# Patient Record
Sex: MALE | Race: WHITE | NOT HISPANIC OR LATINO | Employment: OTHER | URBAN - METROPOLITAN AREA
[De-identification: names, ages, dates, MRNs, and addresses within clinical notes are randomized per-mention and may not be internally consistent; named-entity substitution may affect disease eponyms.]

---

## 2022-04-14 ENCOUNTER — OFFICE VISIT (OUTPATIENT)
Dept: ORTHOPEDIC SURGERY | Facility: CLINIC | Age: 52
End: 2022-04-14

## 2022-04-14 VITALS
DIASTOLIC BLOOD PRESSURE: 80 MMHG | WEIGHT: 274 LBS | BODY MASS INDEX: 35.16 KG/M2 | HEIGHT: 74 IN | SYSTOLIC BLOOD PRESSURE: 134 MMHG

## 2022-04-14 DIAGNOSIS — M25.561 RIGHT KNEE PAIN, UNSPECIFIED CHRONICITY: ICD-10-CM

## 2022-04-14 DIAGNOSIS — M23.91 INTERNAL DERANGEMENT OF RIGHT KNEE: Primary | ICD-10-CM

## 2022-04-14 PROCEDURE — 99204 OFFICE O/P NEW MOD 45 MIN: CPT | Performed by: ORTHOPAEDIC SURGERY

## 2022-04-14 RX ORDER — MELOXICAM 15 MG/1
TABLET ORAL
Qty: 90 TABLET | Refills: 1 | Status: SHIPPED | OUTPATIENT
Start: 2022-04-14 | End: 2022-08-08

## 2022-04-14 RX ORDER — CIPROFLOXACIN 500 MG/1
500 TABLET, FILM COATED ORAL EVERY 12 HOURS
COMMUNITY
Start: 2022-04-06 | End: 2022-05-31

## 2022-04-14 RX ORDER — LISINOPRIL 20 MG/1
20 TABLET ORAL DAILY
COMMUNITY
Start: 2022-03-26

## 2022-04-14 NOTE — PROGRESS NOTES
Orthopaedic Clinic Note: Knee New Patient    Chief Complaint   Patient presents with   • Right Knee - Pain        HPI    Jean Marie Ramirez is a 51 y.o. male who presents with right knee pain for 4 week(s). Onset twisting injury. Pain is localized to the medial joint line, patella and is a 0/10 on the pain scale. Pain is described as dull. Associated symptoms include pain, swelling, popping and giving way/buckling. The pain is worse with any twisting movement ; resting, sitting, ice, lying down and elevating the extremity make it better. Previous treatments have included: bracing and weight loss since symptom onset. Although some transient relief was reported with these interventions, these conservative measures have failed and symptoms have persisted. The patient is limited in daily activities and has had a significant decrease in quality of life as a result. He denies fevers, chills, or constitutional symptoms.    I have reviewed the following portions of the patient's history:History of Present Illness    History reviewed. No pertinent past medical history.   Past Surgical History:   Procedure Laterality Date   • HERNIA REPAIR        Family History   Problem Relation Age of Onset   • Hypertension Father      Social History     Socioeconomic History   • Marital status:    Tobacco Use   • Smoking status: Never Smoker   • Smokeless tobacco: Never Used   Vaping Use   • Vaping Use: Never used   Substance and Sexual Activity   • Alcohol use: Yes   • Drug use: Never   • Sexual activity: Defer      Current Outpatient Medications on File Prior to Visit   Medication Sig Dispense Refill   • ciprofloxacin (CIPRO) 500 MG tablet Take 500 mg by mouth Every 12 (Twelve) Hours.     • lisinopril (PRINIVIL,ZESTRIL) 20 MG tablet Take 20 mg by mouth Daily.     • [DISCONTINUED] methylPREDNISolone (MEDROL, NEELIMA,) 4 MG tablet Take as directed on package instructions. 21 tablet 0     No current facility-administered medications on file  "prior to visit.      No Known Allergies     Review of Systems   Constitutional: Negative.    HENT: Negative.    Eyes: Negative.    Respiratory: Negative.    Cardiovascular: Negative.    Gastrointestinal: Negative.    Endocrine: Negative.    Genitourinary: Negative.    Musculoskeletal: Positive for arthralgias.   Skin: Negative.    Allergic/Immunologic: Negative.    Neurological: Negative.    Hematological: Negative.    Psychiatric/Behavioral: Negative.         The patient's Review of Systems was personally reviewed and confirmed as accurate.    The following portions of the patient's history were reviewed and updated as appropriate: allergies, current medications, past family history, past medical history, past social history, past surgical history and problem list.    Physical Exam  Blood pressure 134/80, height 188 cm (74.02\"), weight 124 kg (274 lb).    Body mass index is 35.16 kg/m².    GENERAL APPEARANCE: awake, alert & oriented x 3, in no acute distress and well developed, well nourished  PSYCH: normal affect  LUNGS:  breathing nonlabored  EYES: sclera anicteric  CARDIOVASCULAR: palpable dorsalis pedis, palpable posterior tibial bilaterally. Capillary refill less than 2 seconds  EXTREMITIES: no clubbing, cyanosis  GAIT:  Normal            Right Lower Extremity Exam:   ----------  Hip Exam  ----------  FLEXION CONTRACTURE: None  FLEXION: 110 degrees  INTERNAL ROTATION: 20 degrees at 90 degrees of flexion   EXTERNAL ROTATION: 40 degrees at 90 degrees of flexion    PAIN WITH HIP MOTION: no  ----------  Knee Exam  ----------  ALIGNMENT: neutral, no varus or valgus deformity     RANGE OF MOTION:  Normal (0-120 degrees) with no extensor lag or flexion contracture  LIGAMENTOUS STABILITY:   stable to varus and valgus stress at terminal extension and 30 degrees without any evidence of laxity     STRENGTH:  5/5 knee flexion, extension. 5/5 ankle dorsiflexion and plantarflexion.     PAIN WITH PALPATION: denies tenderness " to palpation about the knee, denies medial or lateral joint line pain  KNEE EFFUSION: Trace  PAIN WITH KNEE ROM: no  PATELLAR CREPITUS: no  SPECIAL EXAM FINDINGS:  Negative patellar compression, increased laxity with anterior drawer    REFLEXES:  PATELLAR 2+/4  ACHILLES 2+/4    CLONUS: negative  STRAIGHT LEG TEST:   negative    SENSATION TO LIGHT TOUCH:  DEEP PERONEAL/SUPERFICIAL PERONEAL/SURAL/SAPHENOUS/TIBIAL:   intact    EDEMA:  no  ERYTHEMA:  no  WOUNDS/INCISIONS: no overlying skin problems.      Left Lower Extremity Exam:   ----------  Hip Exam  ----------  FLEXION CONTRACTURE: None  FLEXION: 110 degrees  INTERNAL ROTATION: 20 degrees at 90 degrees of flexion   EXTERNAL ROTATION: 40 degrees at 90 degrees of flexion    PAIN WITH HIP MOTION: no  ----------  Knee Exam  ----------  ALIGNMENT: neutral, no varus or valgus deformity     RANGE OF MOTION:  Normal (0-120 degrees) with no extensor lag or flexion contracture  LIGAMENTOUS STABILITY:   stable to varus and valgus stress at terminal extension and 30 degrees without any evidence of laxity     STRENGTH:  5/5 knee flexion, extension. 5/5 ankle dorsiflexion and plantarflexion.     PAIN WITH PALPATION: denies tenderness to palpation about the knee, denies medial or lateral joint line pain  KNEE EFFUSION: no  PAIN WITH KNEE ROM: no  PATELLAR CREPITUS: yes, asymptomatic  SPECIAL EXAM FINDINGS:  Negative patellar compression    REFLEXES:  PATELLAR 2+/4  ACHILLES 2+/4    CLONUS: negative  STRAIGHT LEG TEST:   negative    SENSATION TO LIGHT TOUCH:  DEEP PERONEAL/SUPERFICIAL PERONEAL/SURAL/SAPHENOUS/TIBIAL:   intact    EDEMA:  no  ERYTHEMA:  no  WOUNDS/INCISIONS: no overlying skin problems.      ______________________________________________________________________  ______________________________________________________________________    RADIOGRAPHIC FINDINGS:   Indication: Right knee pain    Comparison: No prior xrays are available for comparison    Right knee(s) 4  views: mild tricompartmental osteoarthritis with no acute bony injury or fracture.  Small para-articular osteophytes visualized primarily along the medial patellofemoral compartments      Assessment/Plan:   Diagnosis Plan   1. Internal derangement of right knee  meloxicam (MOBIC) 15 MG tablet    MRI Knee Right Without Contrast   2. Right knee pain, unspecified chronicity  XR Knee 4+ View Right     Patient has persistent pain despite attempted bracing.  His clinical exam is concerning for ACL tear as he does have increased laxity with anterior drawer test.  I recommend obtaining an MRI to evaluate for ACL injury.  In the interval I will prescribe him meloxicam.  I will see him back after the MRI to discuss results and how to proceed.    Brett Ayala MD  04/14/22  14:30 EDT

## 2022-05-16 ENCOUNTER — TELEPHONE (OUTPATIENT)
Dept: ORTHOPEDIC SURGERY | Facility: CLINIC | Age: 52
End: 2022-05-16

## 2022-05-16 NOTE — TELEPHONE ENCOUNTER
Spoke with patient he understands that he must be evaluated in the office first to consider a left knee MRI, and also understands that we would proceed with left knee xrays first at his follow up visit for his right knee.

## 2022-05-16 NOTE — TELEPHONE ENCOUNTER
Pt called in to see if he could also get an order put in to get a MRI of his left as well...MRI appt is scheduled for 5/17/22 at [facility]

## 2022-05-17 ENCOUNTER — HOSPITAL ENCOUNTER (OUTPATIENT)
Dept: MRI IMAGING | Facility: HOSPITAL | Age: 52
Discharge: HOME | End: 2022-05-17
Admitting: ORTHOPAEDIC SURGERY

## 2022-05-17 ENCOUNTER — TELEPHONE (OUTPATIENT)
Dept: ORTHOPEDIC SURGERY | Facility: CLINIC | Age: 52
End: 2022-05-17

## 2022-05-17 DIAGNOSIS — M23.91 INTERNAL DERANGEMENT OF RIGHT KNEE: ICD-10-CM

## 2022-05-17 PROCEDURE — 73721 MRI JNT OF LWR EXTRE W/O DYE: CPT

## 2022-05-17 NOTE — TELEPHONE ENCOUNTER
I called patient, he had some questions regarding his past medications that he saw on Mychart. I explained what I could to him and he understood. He will call with any further questions or concerns.  Nathalia

## 2022-05-17 NOTE — TELEPHONE ENCOUNTER
Provider: COLT  Caller: AYSE  Phone Number: [phone]  Reason for Call: PT IS ASKING FOR SOME CLARIFICATION ON HIS OFFICE NOTES ON HIS MYCHART.       TOLD TO PUT IN A TE BY NON CLINICAL STAFF THAT ANSWERED CLINICAL LINE

## 2022-05-31 ENCOUNTER — OFFICE VISIT (OUTPATIENT)
Dept: ORTHOPEDIC SURGERY | Facility: CLINIC | Age: 52
End: 2022-05-31

## 2022-05-31 VITALS
DIASTOLIC BLOOD PRESSURE: 82 MMHG | SYSTOLIC BLOOD PRESSURE: 140 MMHG | WEIGHT: 270 LBS | HEIGHT: 74 IN | BODY MASS INDEX: 34.65 KG/M2

## 2022-05-31 DIAGNOSIS — M17.11 PRIMARY OSTEOARTHRITIS OF RIGHT KNEE: Primary | ICD-10-CM

## 2022-05-31 DIAGNOSIS — S83.231A COMPLEX TEAR OF MEDIAL MENISCUS OF RIGHT KNEE AS CURRENT INJURY, INITIAL ENCOUNTER: ICD-10-CM

## 2022-05-31 PROCEDURE — 99214 OFFICE O/P EST MOD 30 MIN: CPT | Performed by: ORTHOPAEDIC SURGERY

## 2022-05-31 PROCEDURE — 20610 DRAIN/INJ JOINT/BURSA W/O US: CPT | Performed by: ORTHOPAEDIC SURGERY

## 2022-05-31 RX ORDER — TRIAMCINOLONE ACETONIDE 40 MG/ML
80 INJECTION, SUSPENSION INTRA-ARTICULAR; INTRAMUSCULAR
Status: COMPLETED | OUTPATIENT
Start: 2022-05-31 | End: 2022-05-31

## 2022-05-31 RX ORDER — LIDOCAINE HYDROCHLORIDE 10 MG/ML
3 INJECTION, SOLUTION EPIDURAL; INFILTRATION; INTRACAUDAL; PERINEURAL
Status: COMPLETED | OUTPATIENT
Start: 2022-05-31 | End: 2022-05-31

## 2022-05-31 RX ADMIN — LIDOCAINE HYDROCHLORIDE 3 ML: 10 INJECTION, SOLUTION EPIDURAL; INFILTRATION; INTRACAUDAL; PERINEURAL at 15:54

## 2022-05-31 RX ADMIN — TRIAMCINOLONE ACETONIDE 80 MG: 40 INJECTION, SUSPENSION INTRA-ARTICULAR; INTRAMUSCULAR at 15:54

## 2022-05-31 NOTE — PROGRESS NOTES
Procedure   Large Joint Arthrocentesis: R knee  Date/Time: 5/31/2022 3:54 PM  Consent given by: patient  Site marked: site marked  Timeout: Immediately prior to procedure a time out was called to verify the correct patient, procedure, equipment, support staff and site/side marked as required   Supporting Documentation  Indications: pain   Procedure Details  Location: knee - R knee  Preparation: Patient was prepped and draped in the usual sterile fashion  Needle size: 22 G  Approach: anterolateral  Medications administered: 3 mL lidocaine PF 1% 1 %; 80 mg triamcinolone acetonide 40 MG/ML (1cc marcaine .75%, NDC 0228-9969-80, Lot [id number], exp [id number])  Patient tolerance: patient tolerated the procedure well with no immediate complications

## 2022-05-31 NOTE — PROGRESS NOTES
Orthopaedic Clinic Note: Knee Established Patient    Chief Complaint   Patient presents with   • Follow-up     7 week MRI follow up -- MRI done 5/17/22; Internal derangement of right knee         HPI    It has been 7  week(s) since Mr. Ramirez's last visit. He returns to clinic today for follow-up right knee pain.  He underwent MRI on 5/17/2022.  He returns to clinic today for follow-up assessment.  Rates his pain 2/10 on the pain scale.  Localizes it to the medial joint line.  He continues to have episodes of knee buckling and instability.  Denies fevers chills or constitutional symptoms.  Overall he is doing about the same.    Past Medical History:   Diagnosis Date   • Knee sprain 4/6/22    Ligaments apparently torn per MRI results      Past Surgical History:   Procedure Laterality Date   • HERNIA REPAIR        Family History   Problem Relation Age of Onset   • Hypertension Father      Social History     Socioeconomic History   • Marital status:    Tobacco Use   • Smoking status: Never Smoker   • Smokeless tobacco: Never Used   Vaping Use   • Vaping Use: Never used   Substance and Sexual Activity   • Alcohol use: Yes     Alcohol/week: 2.0 standard drinks     Types: 1 Glasses of wine, 1 Cans of beer per week   • Drug use: Never   • Sexual activity: Yes     Partners: Female     Birth control/protection: Post-menopausal      Current Outpatient Medications on File Prior to Visit   Medication Sig Dispense Refill   • lisinopril (PRINIVIL,ZESTRIL) 20 MG tablet Take 20 mg by mouth Daily.     • meloxicam (MOBIC) 15 MG tablet 1 PO Daily with food. 90 tablet 1   • [DISCONTINUED] ciprofloxacin (CIPRO) 500 MG tablet Take 500 mg by mouth Every 12 (Twelve) Hours.       No current facility-administered medications on file prior to visit.      No Known Allergies     Review of Systems   Constitutional: Negative.    HENT: Negative.    Eyes: Negative.    Respiratory: Positive for apnea.    Cardiovascular: Negative.   "  Gastrointestinal: Negative.    Endocrine: Negative.    Genitourinary: Negative.    Musculoskeletal: Positive for arthralgias.   Skin: Negative.    Allergic/Immunologic: Negative.    Neurological: Negative.    Hematological: Negative.    Psychiatric/Behavioral: Negative.         The patient's Review of Systems was personally reviewed and confirmed as accurate.    Physical Exam  Blood pressure 140/82, height 188 cm (74.02\"), weight 122 kg (270 lb).    Body mass index is 34.65 kg/m².    GENERAL APPEARANCE: awake, alert, oriented, in no acute distress and well developed, well nourished  LUNGS:  breathing nonlabored  EXTREMITIES: no clubbing, cyanosis  PERIPHERAL PULSES: palpable dorsalis pedis and posterior tibial pulses bilaterally.    GAIT:  Normal        ----------  Right Knee Exam:  ----------  ALIGNMENT: neutral  ----------  RANGE OF MOTION:  Normal (0-120 degrees) with no extensor lag or flexion contracture  LIGAMENTOUS STABILITY:   stable to varus and valgus stress at terminal extension and 30 degrees without any evidence of laxity  ----------  STRENGTH:  KNEE FLEXION 5/5  KNEE EXTENSION  5/5  ANKLE DORSIFLEXION  5/5  ANKLE PLANTARFLEXION  5/5  ----------  PAIN WITH PALPATION:denies tenderness to palpation about the knee  KNEE EFFUSION: yes, trace effusion  PAIN WITH KNEE ROM: no  PATELLAR CREPITUS:  no  ----------  SENSATION TO LIGHT TOUCH:  DEEP PERONEAL/SUPERFICIAL PERONEAL/SURAL/SAPHENOUS/TIBIAL:    intact  ----------  EDEMA:  no  ERYTHEMA:    no  WOUNDS/INCISIONS:   no  _____________________________________________________________________  _____________________________________________________________________    RADIOGRAPHIC FINDINGS:   MRI from 5/17/2022 of the right knee was personally interpreted.  MRI shows evidence of medial meniscus tear involving the posterior horn and meniscal root with questionable partial radial tear.  There is medial compartment joint space narrowing with extrusion of the medial " meniscus.  Small joint effusion.    Assessment/Plan:   Diagnosis Plan   1. Primary osteoarthritis of right knee     2. Complex tear of medial meniscus of right knee as current injury, initial encounter       I discussed the MRI results with the patient.  I discussed that this is a combination of arthritis and meniscus pathology that are resulting in the MRI findings.  I discussed treatment options with him regarding proceeding to knee arthroscopy versus intra-articular injection.  He is agreeable to the intra-articular injection.  If his symptoms fail to improve with the injection, knee arthroscopy for partial medial meniscectomy will be warranted.  He is agreeable to this plan.  Follow-up as needed.    Procedure Note:  I discussed with the patient the potential benefits of performing a therapeutic injection of the right knee as well as potential risks including but not limited to infection, swelling, pain, bleeding, bruising, nerve/vessel damage, skin color changes, transient elevation in blood glucose levels, and fat atrophy. After informed consent and verifying correct patient, procedure site, and type of procedure, the area was prepped with alcohol, ethyl chloride was used to numb the skin. Via the superior lateral approach, 3cc of 1% lidocaine, 1 cc of 0.75% Marcaine and 2 cc of 40mg/ml of Kenalog were injected into the right knee. The patient tolerated the procedure well. There were no complications. A sterile dressing was placed over the injection site.        Brett Ayala MD  05/31/22  16:18 EDT

## 2022-07-19 ENCOUNTER — TELEPHONE (OUTPATIENT)
Dept: ORTHOPEDIC SURGERY | Facility: CLINIC | Age: 52
End: 2022-07-19

## 2022-07-19 NOTE — TELEPHONE ENCOUNTER
Called patient. niravm letting him know that it was recommended that he take OTC aleve- 2 pills tewice daily until her returns from trip.     Taisha

## 2022-07-19 NOTE — TELEPHONE ENCOUNTER
Pt called in to state that he is out of town and forgot his medication, wanted to know if a 7 day supply of his anti-inflammatory couuld be called in...    [id number] [place], [place]            [phone]

## 2022-08-07 DIAGNOSIS — M23.91 INTERNAL DERANGEMENT OF RIGHT KNEE: ICD-10-CM

## 2022-08-08 RX ORDER — MELOXICAM 15 MG/1
TABLET ORAL
Qty: 60 TABLET | Refills: 0 | Status: SHIPPED | OUTPATIENT
Start: 2022-08-08

## 2022-08-09 NOTE — TELEPHONE ENCOUNTER
Refill was provided today; however, if patient to remain on this medication long-term/chronically then further refills will have to be prescribed by PCP so that appropriate kidney/GI monitoring can be completed.

## 2022-09-29 ENCOUNTER — OFFICE VISIT (OUTPATIENT)
Dept: ORTHOPEDIC SURGERY | Facility: CLINIC | Age: 52
End: 2022-09-29

## 2022-09-29 VITALS
SYSTOLIC BLOOD PRESSURE: 150 MMHG | DIASTOLIC BLOOD PRESSURE: 84 MMHG | WEIGHT: 278.4 LBS | BODY MASS INDEX: 35.73 KG/M2 | HEIGHT: 74 IN

## 2022-09-29 DIAGNOSIS — M75.42 SUBACROMIAL IMPINGEMENT OF LEFT SHOULDER: Primary | ICD-10-CM

## 2022-09-29 DIAGNOSIS — M25.512 LEFT SHOULDER PAIN, UNSPECIFIED CHRONICITY: ICD-10-CM

## 2022-09-29 PROCEDURE — 99214 OFFICE O/P EST MOD 30 MIN: CPT | Performed by: ORTHOPAEDIC SURGERY

## 2022-09-29 PROCEDURE — 20610 DRAIN/INJ JOINT/BURSA W/O US: CPT | Performed by: ORTHOPAEDIC SURGERY

## 2022-09-29 RX ORDER — BUPIVACAINE HYDROCHLORIDE 2.5 MG/ML
3 INJECTION, SOLUTION EPIDURAL; INFILTRATION; INTRACAUDAL
Status: COMPLETED | OUTPATIENT
Start: 2022-09-29 | End: 2022-09-29

## 2022-09-29 RX ORDER — TRIAMCINOLONE ACETONIDE 40 MG/ML
80 INJECTION, SUSPENSION INTRA-ARTICULAR; INTRAMUSCULAR
Status: COMPLETED | OUTPATIENT
Start: 2022-09-29 | End: 2022-09-29

## 2022-09-29 RX ORDER — LIDOCAINE HYDROCHLORIDE 10 MG/ML
3 INJECTION, SOLUTION EPIDURAL; INFILTRATION; INTRACAUDAL; PERINEURAL
Status: COMPLETED | OUTPATIENT
Start: 2022-09-29 | End: 2022-09-29

## 2022-09-29 RX ADMIN — LIDOCAINE HYDROCHLORIDE 3 ML: 10 INJECTION, SOLUTION EPIDURAL; INFILTRATION; INTRACAUDAL; PERINEURAL at 08:06

## 2022-09-29 RX ADMIN — BUPIVACAINE HYDROCHLORIDE 3 ML: 2.5 INJECTION, SOLUTION EPIDURAL; INFILTRATION; INTRACAUDAL at 08:06

## 2022-09-29 RX ADMIN — TRIAMCINOLONE ACETONIDE 80 MG: 40 INJECTION, SUSPENSION INTRA-ARTICULAR; INTRAMUSCULAR at 08:06

## 2022-09-29 NOTE — PROGRESS NOTES
Orthopaedic Clinic Note: Established Patient    Chief Complaint   Patient presents with   • Left Shoulder - Pain        HPI    It has been 4  month(s) since Mr. Ramirez's last visit. He returns to clinic today for new complaint of left shoulder pain.  I previously seen him for knee pain in the past.  He comes to clinic today complaining of pain in the left shoulder that has been ongoing for the past couple of months.  He localizes the pain to the deltoid and subacromial region with overhead activity.  Rates it a 6/10 on the pain scale on average.  He states it is limiting daily activities.    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  Schedule scope?      Past Surgical History:   Procedure Laterality Date   • HERNIA REPAIR        Family History   Problem Relation Age of Onset   • Hypertension Father      Social History     Socioeconomic History   • Marital status:    Tobacco Use   • Smoking status: Never Smoker   • Smokeless tobacco: Never Used   Vaping Use   • Vaping Use: Never used   Substance and Sexual Activity   • Alcohol use: Yes     Alcohol/week: 2.0 standard drinks     Types: 1 Glasses of wine, 1 Cans of beer per week   • Drug use: Never   • Sexual activity: Yes     Partners: Female     Birth control/protection: Post-menopausal      Current Outpatient Medications on File Prior to Visit   Medication Sig Dispense Refill   • lisinopril (PRINIVIL,ZESTRIL) 20 MG tablet Take 20 mg by mouth Daily.     • meloxicam (MOBIC) 15 MG tablet TAKE 1 TABLET BY MOUTH DAILY WITH FOOD 60 tablet 0     No current facility-administered medications on file prior to visit.      No Known Allergies     Review of Systems   Constitutional: Negative.    HENT: Negative.    Eyes: Negative.    Respiratory: Negative.    Cardiovascular:  "Negative.    Gastrointestinal: Negative.    Endocrine: Negative.    Genitourinary: Negative.    Musculoskeletal: Positive for arthralgias.   Skin: Negative.    Allergic/Immunologic: Negative.    Neurological: Negative.    Hematological: Negative.    Psychiatric/Behavioral: Negative.    Answers for HPI/ROS submitted by the patient on 9/22/2022  What is the primary reason for your visit?: Other  Please describe your symptoms.: Left shoulder pain (muscular or tendon related?)  Have you had these symptoms before?: No  How long have you been having these symptoms?: Greater than 2 weeks  Please list any medications you are currently taking for this condition.: Aspercreme and Ibuprofen  Please describe any probable cause for these symptoms. 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  Follow-up vitals       The patient's Review of Systems was personally reviewed and confirmed as accurate.    Physical Exam  Blood pressure 150/84, height 188 cm (74.02\"), weight 126 kg (278 lb 6.4 oz).    Body mass index is 35.73 kg/m².    GENERAL APPEARANCE: awake, alert, oriented, in no acute distress and well developed, well nourished  LUNGS:  breathing nonlabored  EXTREMITIES: no clubbing, cyanosis        Left Upper Extremity Exam:   LEFT SHOULDER EXAM:    RANGE OF MOTION:  ---------------  Forward Flexion: 0 - 180 degrees   Abduction: 0 - 180 degrees  Internal Rotation: T10 degrees  External Rotation: 50 degrees  ---------------  STRENGTH:  ---------------  Supraspinatus: 5/5  Infraspinatus: 5/5  Teres Minor: 5/5  Subscapularis: 5/5  ---------------  PAIN WITH PALPATION: Tender to palpation about subdeltoid bursa  ---------------  PROVOCATIVE MANEUVERS:  ---------------  Yergason's Test: negative  Speeds Test:  negative  Rivera Test: negative  Neer Test: positive  Cross Body Adduction Test: negative  Apprehension Test: negative  ---------------  SENSATION TO LIGHT TOUCH:  AXILLARY/MUSCULOCUTANEOUS/MEDIAN/RADIAL/ULNAR:   " intact    Palpable radial pulse    EDEMA:  no  ERYTHEMA:  no  WOUNDS/INCISIONS:  no    _______________________________________________________________  _______________________________________________________________    RADIOGRAPHIC FINDINGS:   Indication: Left shoulder pain    Comparison: No prior xrays are available for comparison    Left shoulder 3 views: Radiographs demonstrate moderate AC joint arthritis with mild glenohumeral joint arthritis.  No acute bony injury or fracture.      Assessment/Plan:   Diagnosis Plan   1. Subacromial impingement of left shoulder     2. Left shoulder pain, unspecified chronicity  XR Shoulder 2+ View Left     Patient suffering from subacromial impingement of the left shoulder.  I discussed treatment options.  He is agreeable to left shoulder subacromial bursa steroid injection today.  He will follow-up as needed.    Procedure Note:  I discussed with the patient the potential benefits of performing a therapeutic injection of the left shoulder subacromial space as well as potential risks including but not limited to infection, swelling, pain, bleeding, bruising, nerve/vessel damage, skin color changes, transient elevation in blood glucose levels, and fat atrophy. After informed consent and verifying correct patient, procedure site, and type of procedure, the area was prepped with alcohol, ethyl chloride was used to numb the skin. Via the superolateral approach, 3cc of 1% lidocaine, 3 cc of 0.25% Marcaine and 2 cc of 40mg/ml of Kenalog were injected into the left shoulder subacromial space. The patient tolerated the procedure well. There were no complications. A sterile dressing was placed over the injection site.      Brett Ayala MD  09/29/22  08:14 EDT

## 2022-09-29 NOTE — PROGRESS NOTES
Procedure   Large Joint Arthrocentesis: L subacromial bursa  Date/Time: 9/29/2022 8:06 AM  Consent given by: patient  Site marked: site marked  Timeout: Immediately prior to procedure a time out was called to verify the correct patient, procedure, equipment, support staff and site/side marked as required   Supporting Documentation  Indications: pain   Procedure Details  Location: shoulder - L subacromial bursa  Preparation: Patient was prepped and draped in the usual sterile fashion  Needle size: 22 G  Approach: posterior  Medications administered: 80 mg triamcinolone acetonide 40 MG/ML; 3 mL lidocaine PF 1% 1 %; 3 mL bupivacaine (PF) 0.25 %  Patient tolerance: patient tolerated the procedure well with no immediate complications

## 2023-04-14 ENCOUNTER — TELEPHONE (OUTPATIENT)
Dept: ORTHOPEDIC SURGERY | Facility: CLINIC | Age: 53
End: 2023-04-14
Payer: COMMERCIAL

## 2023-04-14 NOTE — TELEPHONE ENCOUNTER
PATIENT CALLED AND IS REQUESTING A PRESCRIPTION FOR A NEW KNEE BRACE.  PLEASE ADVISE.  HE MAY BE REACHED [phone]

## 2023-04-14 NOTE — TELEPHONE ENCOUNTER
Discussed with Cary in office who signed a script for an OA Reaction Web knee brace.     Called pt to find out how he would like to receive script and he may come by to  the script or I told him I may be able to discuss with Kobe to see if we can provide him with the brace despite having a recent office visit; If we did stop by, it would be Monday or Tuesday. Script placed in file folder at .    Moi TURNER CMA ([facility]), ROT

## 2023-04-14 NOTE — TELEPHONE ENCOUNTER
Spoke to Dr. Ayala in office regarding pt message and he is recommending pt come in for a repeat exam before writing script for a new knee brace.    Moi TURNER CMA ([facility]), ROT

## 2023-04-14 NOTE — TELEPHONE ENCOUNTER
Called pt to let him know Dr. Ayala's message; He explains that his knee is actually doing okay with the use of the current brace he is wearing and doesn't feel like he needs to come back in; the velcro on the brace has just become worn which is why he is requesting a script for a new brace.    He also explains that at his first visit with Dr. Ayala for the knee, he wore his current brace (that he had purchased offline) and Dr. Ayala mentioned that the type of brace was appropriate and was actually the same type we would have provided him. So I told him I would check with Dr. Ayala or his PA to see if they would be agreeable to providing a script for a new [place] OA reaction web knee brace. He understood.    Mio TURNER CMA ([facility]), ROT

## 2024-04-01 ENCOUNTER — OFFICE VISIT (OUTPATIENT)
Dept: ORTHOPEDIC SURGERY | Facility: CLINIC | Age: 54
End: 2024-04-01
Payer: COMMERCIAL

## 2024-04-01 VITALS
SYSTOLIC BLOOD PRESSURE: 128 MMHG | DIASTOLIC BLOOD PRESSURE: 78 MMHG | BODY MASS INDEX: 36.5 KG/M2 | WEIGHT: 284.4 LBS | HEIGHT: 74 IN

## 2024-04-01 DIAGNOSIS — S63.639A SPRAIN OF PROXIMAL INTERPHALANGEAL (PIP) JOINT OF FINGER: Primary | ICD-10-CM

## 2024-04-01 DIAGNOSIS — M79.642 LEFT HAND PAIN: ICD-10-CM

## 2024-04-01 PROCEDURE — 99213 OFFICE O/P EST LOW 20 MIN: CPT | Performed by: STUDENT IN AN ORGANIZED HEALTH CARE EDUCATION/TRAINING PROGRAM

## 2024-04-01 RX ORDER — ATORVASTATIN CALCIUM 10 MG/1
10 TABLET, FILM COATED ORAL DAILY
COMMUNITY
Start: 2023-09-29 | End: 2024-09-28

## 2024-04-01 RX ORDER — LISINOPRIL AND HYDROCHLOROTHIAZIDE 12.5; 1 MG/1; MG/1
1 TABLET ORAL DAILY
COMMUNITY
Start: 2024-03-01

## 2024-04-01 RX ORDER — METHYLPREDNISOLONE 4 MG/1
1 TABLET ORAL DAILY
Qty: 21 TABLET | Refills: 0 | Status: SHIPPED | OUTPATIENT
Start: 2024-04-01

## 2024-04-01 RX ORDER — APIXABAN 5 MG/1
5 TABLET, FILM COATED ORAL
COMMUNITY
Start: 2024-03-15

## 2024-04-01 RX ORDER — LISINOPRIL 20 MG/1
1 TABLET ORAL DAILY
COMMUNITY
Start: 2024-01-17

## 2024-04-01 NOTE — PROGRESS NOTES
"                                                                 [facility] Orthopedic     Office Visit       Date: 04/01/2024   Patient Name: Jean Marie Ramirez  MRN: [id number]  YOB: 1970    Referring Physician: No ref. provider found     Chief Complaint:   Chief Complaint   Patient presents with    Left Hand - Pain     Long finger pain       History of Present Illness:   Jean Marie Ramirez is a 53 y.o. male right-hand-dominant presented clinic with complaints of left long finger pain.  Patient reports that 3 years ago he sustained an injury to his left long and ring fingers when his dog leash was wrapped around the digits.  This required splinting for wound healing.  Since that time he has had intermittent pain swelling and stiffness mostly to the long finger PIP joint.  He reports this worsened several days ago with associated swelling and increased stiffness.  This has improved.  He has been using a splint at home as needed.  He is also been taking ibuprofen.  No recent reinjury.  No other complaints or concerns.    Subjective   Review of Systems:   Review of Systems   Pertinent review of systems per HPI    I reviewed the patient's chief complaint, history of present illness, review of systems, past medical history, surgical history, family history, social history, medications and allergy list in the EMR on 04/01/2024 and agree with the findings above.    Objective    Quality Measures:   ACP:   ACP discussion was declined by the patient.      Tobacco:   Jean Marie Ramirez  reports that he has never smoked. He has never used smokeless tobacco.     Vital Signs:   Vitals:    04/01/24 1305   BP: 128/78   Weight: 129 kg (284 lb 6.4 oz)   Height: 188 cm (74\")     BMI:      General: No acute distress. Alert and oriented.     Ortho Exam:  Examination of left upper extremity demonstrates no deformity.  There are no skin lesions or abrasions.  Mild swelling noted throughout the long finger worse at the PIP joint as " compared to the contralateral side.  He is diffusely tender at the long finger PIP joint along the radial and ulnar borders as well as the volar plate.  There is no PIP joint instability noted at full extension or 30 degrees of flexion.  He is able to make a composite fist.  Sensations intact to light touch throughout the hand.  Warm and well-perfused distally.    Imaging / Studies:    Imaging Results (Last 24 Hours)       Procedure Component Value Units Date/Time    XR Hand 3+ View Left [[id number]] Resulted: 04/01/24 1332     Updated: 04/01/24 1334    Narrative:      Left Hand X-Ray    Indication: Pain    Views:  PA, Lateral, and Oblique     Comparison:  None    Findings:  No fractures or dislocation. No bony lesions. Normal soft tissues.   Maintained joint spaces without osteophyte formation noted at the MCP   joints.  There is mild joint space narrowing and osteophyte formation   noted at the thumb CMC joint.    Impression:   Mild thumb CMC joint degenerative changes.               Assessment / Plan    Assessment/Plan:   Jean Marie Ramirez is a 53 y.o. male with a left long finger PIP joint sprain.    I discussed with the patient their clinical and radiographic findings demonstrate a PIP joint sprain.  We had a lengthy discussion regarding the pathophysiology of their diagnosis.  The patient had a previous injury several years ago with intermittent symptoms of swelling and stiffness to the PIP joint.  There are no acute fractures or significant underlying arthritic changes noted on x-ray.  His clinical exam demonstrates mild tenderness diffusely about the PIP joint without any significant abnormalities noted.  I will prescribe him a Medrol Dosepak for pain.  I will also refer to be evaluated by our hand therapist for a home exercise program.  I will see him back in 2 months for reevaluation of his symptoms.  He may cancel if he is doing well. They were agreeable with the plan.  All questions and concerns were  addressed.        ICD-10-CM ICD-9-CM   1. Sprain of proximal interphalangeal (PIP) joint of finger  S63.639A 842.13   2. Left hand pain  M79.642 729.5     Follow Up:   Return in about 2 months (around 6/1/2024) for Follow Up.      Aliza Mccormick MD  [facility] [place] Orthopedic & Hand Surgeon

## 2024-04-15 ENCOUNTER — TREATMENT (OUTPATIENT)
Dept: PHYSICAL THERAPY | Facility: CLINIC | Age: 54
End: 2024-04-15
Payer: COMMERCIAL

## 2024-04-15 DIAGNOSIS — S63.639A SPRAIN OF PROXIMAL INTERPHALANGEAL (PIP) JOINT OF FINGER: ICD-10-CM

## 2024-04-15 DIAGNOSIS — M24.9 DERANGEMENT OF HAND JOINT: Primary | ICD-10-CM

## 2024-04-15 PROCEDURE — 97530 THERAPEUTIC ACTIVITIES: CPT | Performed by: PHYSICAL THERAPIST

## 2024-04-15 PROCEDURE — 97161 PT EVAL LOW COMPLEX 20 MIN: CPT | Performed by: PHYSICAL THERAPIST

## 2024-04-15 NOTE — PROGRESS NOTES
Physical Therapy Initial Evaluation and Plan of Care  [facility]ICAL THERAPY [place]  [address] [facility], [address] [address]        Patient: Jean Marie Ramirez   : 1970  Diagnosis/ICD-10 Code:  Derangement of hand joint [M24.9]  Referring practitioner: Aliza Mccormick MD  Date of Initial Visit: 4/15/2024  Today's Date: 4/15/2024  Patient seen for 1 sessions         Visit Diagnoses:    ICD-10-CM ICD-9-CM   1. Derangement of hand joint  M24.9 718.94   2. Sprain of proximal interphalangeal (PIP) joint of finger  S63.639A 842.13         Subjective Questionnaire: QuickDASH: 20.45%    Subjective Evaluation    History of Present Illness  Mechanism of injury:  53 y.o. male right-hand-dominant presented clinic with complaints of left long finger pain.  Patient reports that 3 years ago he sustained an injury to his left long and ring fingers when his dog leash was wrapped around the digits.  Creating wounds took a while to heal.  This required splinting for wound healing.  Since that time he has had intermittent pain swelling and stiffness mostly to the long finger PIP joint.  He reports this worsened several days ago with associated swelling and increased stiffness.  This has improved.  He has been using a splint at home as needed.  He is also been taking ibuprofen.    CURRENT COMPLAINT  Feels like EDC tendon is cramping, very sharp pain at dorsal PIP joint    Pain  Current pain ratin  At worst pain ratin    Hand dominance: right           Objective          Observations     Left Wrist/Hand   Positive for edema (Mild plus PIP joint).       Tenderness     Additional Tenderness Details  Left Long Finger Tender radial side/dorsal PIP joint, on the extensor ortega.      Neurological Testing     Sensation     Wrist/Hand   Left   Intact: light touch and pin prick    Active Range of Motion     Left Wrist   Normal active range of motion    Left Digits    Flexion   Middle     MCP: 95 degrees    PIP: 98  degrees    DIP: 65 degrees  Extension   Middle     MCP: 0 degrees    PIP: 0 degrees    DIP: 0 degrees    Strength/Myotome Testing     Left Wrist/Hand   Wrist extension: 5  Wrist flexion: 5     (2nd hand position)   Left  strength (2nd hand position) 87 lbs    Thumb Strength  Palmar/Three-Point Pinch     Trial 1: 21 lbs    Right Wrist/Hand      (2nd hand position)   Right  strength (2nd hand position) 98 lbs    Thumb Strength   Palmar/Three-Point Pinch     Trial 1: 19 lbs    Tests     Left Wrist/Hand   Positive RCL varus stress (No laxity but painful dorsally) and resisted middle finger (Painful at dorsal long finger PIP joint).           Assessment & Plan       Assessment  Impairments: abnormal or restricted ROM, activity intolerance, impaired physical strength and pain with function   Functional limitations: carrying objects, lifting, pulling, uncomfortable because of pain and unable to perform repetitive tasks   Assessment details: Pt. is a 53 year old male who has experienced an injury at home to the left long finger about 3 years ago, from a dog leash injury.  This has resulted in an injury to the PIP joint, resulting in a derangement of the finger.  Suspect a radial side dorsal ortega ada with possible scarring issue. The normal neurological exam is normal.  Problems:  discomfort, decreased strength, decreased ROM of finger, and decrease functional activities.    Prognosis: good  Prognosis details:       Goals  Plan Goals:   STG to be met in 4 weeks  1. Increase PIP flexion to 100 degrees of the left long finger.  2. Increase PIP extension to 0 degrees of the Left long finger  3. Pt. has at least 80 lbs. of  on the left.  4. Pt. reports decrease in finger pain by 50%.  LTG to be met in 12 weeks  1. Pt. Independent with HEP.  2. Pt. returns to normal activities and work without complications.  3. Improved hand function so that Quick Dash score improves to 10%  4. Pt. has functional ROM of the  left long finger.    Plan  Therapy options: will be seen for skilled therapy services  Planned modality interventions: cryotherapy, fluidotherapy, high voltage pulsed current (pain management), iontophoresis and ultrasound  Planned therapy interventions: compression, fine motor coordination training, functional ROM exercises, home exercise program, joint mobilization, manual therapy, neuromuscular re-education, orthotic fitting/training, soft tissue mobilization, strengthening, stretching and therapeutic activities  Frequency: 2x month  Duration in weeks: 12  Treatment plan discussed with: patient        Timed:         Manual Therapy:         mins  71533;     Therapeutic Exercise:         mins  76466;     Neuromuscular Erika:        mins  86416;    Therapeutic Activity:     16     mins  62302;     Gait Training:           mins  08101;     Ultrasound:          mins  05729;    Ionto                                   mins   86986  Self Care                            mins   14125  Canalith Repos         mins 84596      Un-Timed:  Electrical Stimulation:         mins  36741 ( );  Dry Needling          mins self-pay  Traction     20     mins 59841  Low Eval          Mins  75748  Mod Eval          Mins  13151  High Eval                            Mins  98309        Timed Treatment:   16   mins   Total Treatment:     36   mins          PT: Hammad Bolden PT, [facility]     License Number: [id number]  Electronically signed by Hammad Bolden PT, 04/15/24, 11:06 AM EDT    Initial Certification  Certification Period: 7/14/2024  I certify that the therapy services are furnished while this patient is under my care.  The services outlined above are required by this patient, and will be reviewed every 90 days.     PHYSICIAN: ________________________________________________________  Aliza Mccormick MD        DATE: ____________________________________________________________    Please sign and return via fax to [phone]..  Thank you, [facility].

## 2024-05-30 ENCOUNTER — OFFICE VISIT (OUTPATIENT)
Dept: ORTHOPEDIC SURGERY | Facility: CLINIC | Age: 54
End: 2024-05-30
Payer: COMMERCIAL

## 2024-05-30 ENCOUNTER — TELEPHONE (OUTPATIENT)
Dept: ORTHOPEDIC SURGERY | Facility: CLINIC | Age: 54
End: 2024-05-30

## 2024-05-30 VITALS
HEIGHT: 74 IN | DIASTOLIC BLOOD PRESSURE: 84 MMHG | SYSTOLIC BLOOD PRESSURE: 130 MMHG | WEIGHT: 289.4 LBS | BODY MASS INDEX: 37.14 KG/M2

## 2024-05-30 DIAGNOSIS — M75.42 IMPINGEMENT SYNDROME OF LEFT SHOULDER: Primary | ICD-10-CM

## 2024-05-30 DIAGNOSIS — M25.512 LEFT SHOULDER PAIN, UNSPECIFIED CHRONICITY: ICD-10-CM

## 2024-05-30 DIAGNOSIS — M75.82 ROTATOR CUFF TENDINITIS, LEFT: ICD-10-CM

## 2024-05-30 RX ORDER — LIDOCAINE HYDROCHLORIDE 10 MG/ML
5 INJECTION, SOLUTION EPIDURAL; INFILTRATION; INTRACAUDAL; PERINEURAL
Status: COMPLETED | OUTPATIENT
Start: 2024-05-30 | End: 2024-05-30

## 2024-05-30 RX ORDER — TRIAMCINOLONE ACETONIDE 40 MG/ML
40 INJECTION, SUSPENSION INTRA-ARTICULAR; INTRAMUSCULAR
Status: COMPLETED | OUTPATIENT
Start: 2024-05-30 | End: 2024-05-30

## 2024-05-30 RX ADMIN — TRIAMCINOLONE ACETONIDE 40 MG: 40 INJECTION, SUSPENSION INTRA-ARTICULAR; INTRAMUSCULAR at 12:03

## 2024-05-30 RX ADMIN — LIDOCAINE HYDROCHLORIDE 5 ML: 10 INJECTION, SOLUTION EPIDURAL; INFILTRATION; INTRACAUDAL; PERINEURAL at 12:03

## 2024-05-30 NOTE — PROGRESS NOTES
[facility] Office Follow Up       Office Follow Up Visit       Patient Name: Jean Marie Ramirez    Chief Complaint:   Chief Complaint   Patient presents with    Follow-up     2 year f/u - Subacromial impingement of left shoulder. Last cortisone injection 09/29/2022       Referring Physician: No ref. provider found    History of Present Illness:   Jean Marie Ramirez returns to clinic today for follow-up of left shoulder pain.  Ongoing since 2022.  He recalls an injury at that time in which he was lifting a heavy tent over his left shoulder.  He saw Dr. Ayala and received a left shoulder subacromial injection.  Responded well.  He says he has had a return of pain over the last several months.  No recent trauma or injury.  He has had difficulty reaching above his head.  Pain relieved with rest and anti-inflammatories.    Upcoming camping trip with his family to [place].      Subjective     Review of Systems   Constitutional: Negative.  Negative for chills, fatigue and fever.   HENT: Negative.  Negative for congestion and dental problem.    Eyes: Negative.  Negative for blurred vision.   Respiratory: Negative.  Negative for shortness of breath.    Cardiovascular: Negative.  Negative for leg swelling.   Gastrointestinal: Negative.  Negative for abdominal pain.   Endocrine: Negative.  Negative for polyuria.   Genitourinary: Negative.  Negative for difficulty urinating.   Musculoskeletal:  Positive for arthralgias.   Skin: Negative.    Allergic/Immunologic: Negative.    Neurological: Negative.    Hematological: Negative.  Negative for adenopathy.   Psychiatric/Behavioral: Negative.  Negative for behavioral problems.         I have reviewed and updated the following portions of the patient's history and review of systems: allergies, current medications, past family history, past medical history, past social history, past surgical history and problem  "list.    Medications:   Current Outpatient Medications:     atorvastatin (LIPITOR) 10 MG tablet, Take 1 tablet by mouth Daily., Disp: , Rfl:     Eliquis 5 MG tablet tablet, Take 1 tablet by mouth., Disp: , Rfl:     lisinopril (PRINIVIL,ZESTRIL) 20 MG tablet, Take 1 tablet by mouth Daily., Disp: , Rfl:     lisinopril-hydrochlorothiazide (PRINZIDE,ZESTORETIC) 10-12.5 MG per tablet, Take 1 tablet by mouth Daily., Disp: , Rfl:     methylPREDNISolone (MEDROL) 4 MG dose pack, Take 1 tablet by mouth Daily. Use as directed by package instructions, Disp: 21 tablet, Rfl: 0    Allergies: No Known Allergies      Objective      Vital Signs:   Vitals:    05/30/24 1131   BP: 130/84   Weight: 131 kg (289 lb 6.4 oz)   Height: 188 cm (74.02\")       Ortho Exam:  General: no acute distress, comfortable  Vitals reviewed in chart    Musculoskeletal Exam    SIDE: Left shoulder  Shoulder Exam:    Tenderness: rotator cuff    Range of motion measurements (degrees)  Forward flexion/Abduction/External rotation at side/ER at 90/IR at 90/IR position  Active: 160/150/60/60  Passive: same    Painful arc of motion: yes  No evidence of septic joint  Pain with forward flexion and abduction greater: yes  Impingement test: painful    Rotator Cuff Testing:  Tenderness to palpation at rotator cuff - yes  Rotator cuff testing Ariel's test - painful  Rotator cuff testing External rotation - strong  Rotator cuff testing Lag signs - negative  Pain with abduction great than 90 degrees - yes    Long head of the biceps testing:  Szymanski's test for biceps - negative  Bicipital groove tenderness to palpation -no  Speed's test  - negative    AC Joint:  AC joint tenderness to palpation - no  AC joint Prominence - no      Results Review:  XR Shoulder 2+ View Left  Imaging: shoulder x-rays 3 views - AP, axillary, and scapular-Y x-ray   views    Side: LEFT SHOULDER    Indication for shoulder x-ray 3 views: shoulder pain    Comparison: 9/29/2022 comparison views " available    Findings: No acute bony pathology. No superior humeral head migration.    The humeral head remains centered in the glenohumeral joint.     Baseline degenerative AC joint changes.  Small inferior glenoid osteophyte   stable compared to prior.  Small osteophyte noted off of the greater   tuberosity but no obvious evidence of acute bony change-well-corticated.    I personally reviewed the above x-rays.      Assessment / Plan      Assessment:   Diagnoses and all orders for this visit:    1. Impingement syndrome of left shoulder (Primary)  -     - Large Joint Arthrocentesis: L subacromial bursa    2. Rotator cuff tendinitis, left    3. Left shoulder pain, unspecified chronicity  -     XR Shoulder 2+ View Left        Quality Metrics:   BMI:   Class 2 Severe Obesity (BMI >=35 and <=39.9). Obesity-related health conditions include the following: none.     Tobacco:   Jean Marie Ramirez  reports that he has never smoked. He has never used smokeless tobacco.           Plan:  X-ray images left shoulder personally reviewed and interpreted today.  No evidence of acute bony findings.  Mild degenerative changes similar compared to prior.  Recommend corticosteroid injection into left subacromial space today for impingement symptoms.  Recommend use of voltaren gel on shoulder as needed.   We will reassess in a couple months if needed.  May consider MRI if symptoms persist.    SHOULDER SUBACROMIAL SPACE INJECTION: Risks and benefits of a shoulder subacromial space injection were discussed and the patient desired to proceed. Verbal consent was obtained. The patient understood the risk of infection, potential skin changes, bump in blood glucose especially with diabetes, nerve injury, possibility of increased pain in the short term, and possible incomplete pain relief.  Using sterile technique, the shoulder subacromial space was injected from a posterior approach with 1mL of 40 mg triamcinolone acetonide 40 MG/ML and 4cc of  lidocaine with aspiration prior to injection. The patient tolerated the procedure without difficulty.  CPT CODE 35650 for major joint aspiration/injection      Follow-up in 6-8 weeks if needed    Daya Michaels PA-C  [facility] Orthopedic Surgery    Dictated using Dragon Speech Recognition.

## 2024-05-30 NOTE — PROGRESS NOTES
Procedure   - Large Joint Arthrocentesis: L subacromial bursa on 5/30/2024 12:03 PM  Indications: pain  Details: 21 G needle, posterior approach  Medications: 5 mL lidocaine PF 1% 1 %; 40 mg triamcinolone acetonide 40 MG/ML  Outcome: tolerated well, no immediate complications  Procedure, treatment alternatives, risks and benefits explained, specific risks discussed. Consent was given by the patient. Immediately prior to procedure a time out was called to verify the correct patient, procedure, equipment, support staff and site/side marked as required. Patient was prepped and draped in the usual sterile fashion.

## 2024-05-30 NOTE — TELEPHONE ENCOUNTER
Called the patient to let him know that Daya does not recommend he take meloxicam , because he is already taking eliquis and both thin the blood . She recommended Voltaren gel on the shoulder instead . He verbalized understanding

## 2024-05-30 NOTE — TELEPHONE ENCOUNTER
Patient received natalia injection today in shoulder and is requesting meloxicam (MOBIC) 15 MG tablet sent to pharmacy.       [id number] - [place], [place] - [address] AT [facility] [address] - [phone] PH

## 2024-07-04 ENCOUNTER — PATIENT MESSAGE (OUTPATIENT)
Dept: ORTHOPEDIC SURGERY | Facility: CLINIC | Age: 54
End: 2024-07-04
Payer: COMMERCIAL

## 2024-07-05 NOTE — TELEPHONE ENCOUNTER
From: Jean Marie Ramirez  To: Daya Michaels  Sent: 7/4/2024 12:04 PM EDT  Subject: Please cancel my follow-up visit for 7/11/2024.     My shoulder is feeling great. No complaints.

## 2025-03-13 ENCOUNTER — OFFICE VISIT (OUTPATIENT)
Dept: ORTHOPEDIC SURGERY | Facility: CLINIC | Age: 55
End: 2025-03-13
Payer: COMMERCIAL

## 2025-03-13 VITALS
DIASTOLIC BLOOD PRESSURE: 80 MMHG | WEIGHT: 294 LBS | BODY MASS INDEX: 37.73 KG/M2 | HEIGHT: 74 IN | SYSTOLIC BLOOD PRESSURE: 160 MMHG

## 2025-03-13 DIAGNOSIS — M25.511 RIGHT SHOULDER PAIN, UNSPECIFIED CHRONICITY: ICD-10-CM

## 2025-03-13 DIAGNOSIS — M75.41 IMPINGEMENT SYNDROME OF RIGHT SHOULDER: Primary | ICD-10-CM

## 2025-03-13 DIAGNOSIS — M25.512 LEFT SHOULDER PAIN, UNSPECIFIED CHRONICITY: ICD-10-CM

## 2025-03-13 RX ORDER — TRIAMCINOLONE ACETONIDE 40 MG/ML
40 INJECTION, SUSPENSION INTRA-ARTICULAR; INTRAMUSCULAR
Status: COMPLETED | OUTPATIENT
Start: 2025-03-13 | End: 2025-03-13

## 2025-03-13 RX ORDER — LIDOCAINE HYDROCHLORIDE 10 MG/ML
5 INJECTION, SOLUTION EPIDURAL; INFILTRATION; INTRACAUDAL; PERINEURAL
Status: COMPLETED | OUTPATIENT
Start: 2025-03-13 | End: 2025-03-13

## 2025-03-13 RX ORDER — ATORVASTATIN CALCIUM 20 MG/1
20 TABLET, FILM COATED ORAL DAILY
COMMUNITY
Start: 2025-02-20

## 2025-03-13 RX ADMIN — LIDOCAINE HYDROCHLORIDE 5 ML: 10 INJECTION, SOLUTION EPIDURAL; INFILTRATION; INTRACAUDAL; PERINEURAL at 10:32

## 2025-03-13 RX ADMIN — TRIAMCINOLONE ACETONIDE 40 MG: 40 INJECTION, SUSPENSION INTRA-ARTICULAR; INTRAMUSCULAR at 10:32

## 2025-03-13 NOTE — PROGRESS NOTES
[facility] Office Visit - Daya Michaels PA-C    Office Visit       Patient Name: Jean Marie Ramirez    Chief Complaint:   Chief Complaint   Patient presents with    Right Shoulder - Pain, Initial Evaluation       Referring Physician: No ref. provider found    History of Present Illness:   Jean Marie Ramirez is a 54 y.o. male who presents with right shoulder pain.  Ongoing for the last month.  Denies injury or trauma.  Gradually worsening.  Has a difficult time reaching behind his back.  He has recently been working on some home projects in which the shoulder pain worsens.     Previously treated for left shoulder pain.  Left shoulder subacromial corticosteroid injection last year provided great relief.      Subjective     Review of Systems   Constitutional: Negative.  Negative for chills, fatigue and fever.   HENT: Negative.  Negative for congestion and dental problem.    Eyes: Negative.  Negative for blurred vision.   Respiratory: Negative.  Negative for shortness of breath.    Cardiovascular: Negative.  Negative for leg swelling.   Gastrointestinal: Negative.  Negative for abdominal pain.   Endocrine: Negative.  Negative for polyuria.   Genitourinary: Negative.  Negative for difficulty urinating.   Musculoskeletal:  Positive for arthralgias.   Skin: Negative.    Allergic/Immunologic: Negative.    Neurological: Negative.    Hematological: Negative.  Negative for adenopathy.   Psychiatric/Behavioral: Negative.  Negative for behavioral problems.         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  Schedule scope?       Past Surgical History:   Past Surgical History:   Procedure Laterality Date    HERNIA REPAIR         Family  "History:   Family History   Problem Relation Age of Onset    Hypertension Father        Social History:   Social History     Socioeconomic History    Marital status:    Tobacco Use    Smoking status: Never    Smokeless tobacco: Never   Vaping Use    Vaping status: Never Used   Substance and Sexual Activity    Alcohol use: Yes     Alcohol/week: 2.0 standard drinks of alcohol     Types: 1 Glasses of wine, 1 Cans of beer per week    Drug use: Never    Sexual activity: Yes     Partners: Female     Birth control/protection: Post-menopausal       Medications:   Current Outpatient Medications:     atorvastatin (LIPITOR) 20 MG tablet, Take 1 tablet by mouth Daily., Disp: , Rfl:     Eliquis 5 MG tablet tablet, Take 1 tablet by mouth., Disp: , Rfl:     lisinopril (PRINIVIL,ZESTRIL) 20 MG tablet, Take 1 tablet by mouth Daily., Disp: , Rfl:     lisinopril-hydrochlorothiazide (PRINZIDE,ZESTORETIC) 10-12.5 MG per tablet, Take 1 tablet by mouth Daily., Disp: , Rfl:     methylPREDNISolone (MEDROL) 4 MG dose pack, Take 1 tablet by mouth Daily. Use as directed by package instructions, Disp: 21 tablet, Rfl: 0    Allergies: No Known Allergies    I have reviewed and updated the following portions of the patient's history and review of systems: allergies, current medications, past family history, past medical history, past social history, past surgical history and problem list.    Objective      Vital Signs:   Vitals:    03/13/25 1005   BP: 160/80   Weight: 133 kg (294 lb)   Height: 188 cm (74.02\")       Ortho Exam:  General: no acute distress, comfortable  Vitals reviewed in chart    Musculoskeletal Exam    SIDE: Right  Shoulder Exam:    Tenderness: No focal tenderness    Range of motion measurements (degrees)  Forward flexion/Abduction/External rotation at side/ER at 90/IR at 90/IR position  Active: 150/140/60/L5  Passive: Same    Painful arc of motion: Yes  No evidence of septic joint  Pain with forward flexion and abduction " greater: Yes  Impingement test: Positive    Rotator Cuff Testing:  Tenderness to palpation at rotator cuff -no  Rotator cuff testing Ariel's test -negative  Rotator cuff testing External rotation -strong  Rotator cuff testing Lag signs -negative  Pain with abduction great than 90 degrees -yes    Long head of the biceps testing:  Szymanski's test for biceps -negative  Bicipital groove tenderness to palpation -no  Speed's test  -negative    AC Joint:  AC joint tenderness to palpation -no  AC joint Prominence -no      Results Review:   XR Shoulder 2+ View Right  Imaging: shoulder x-rays 3 views - AP, axillary, and scapular-Y x-ray   views    Side: RIGHT SHOULDER    Indication for shoulder x-ray 3 views: shoulder pain    Comparison: contralateral comparison views available    Findings: No acute bony pathology. No superior humeral head migration.    The humeral head remains centered in the glenohumeral joint. No evidence   of calcific tendonitis.  Inferior acromial osteophyte noted.    I personally reviewed the above x-rays.         Assessment / Plan      Assessment:  Diagnoses and all orders for this visit:    1. Impingement syndrome of right shoulder (Primary)  -     Ambulatory Referral to Physical Therapy for Evaluation & Treatment    2. Right shoulder pain, unspecified chronicity  -     Cancel: XR Shoulder 2+ View Right  -     Ambulatory Referral to Physical Therapy for Evaluation & Treatment    3. Left shoulder pain, unspecified chronicity  -     Ambulatory Referral to Physical Therapy for Evaluation & Treatment    Other orders  -     - Large Joint Arthrocentesis: R subacromial bursa        Quality Metrics:   BMI:   Class 2 Severe Obesity (BMI >=35 and <=39.9). Obesity-related health conditions include the following: osteoarthritis.     Tobacco:   Jean Marie Ramirez  reports that he has never smoked. He has never used smokeless tobacco.        Plan:  X-ray images right shoulder reviewed today with the patient.  No acute bony  changes.  Mild degenerative changes at the AC joint with inferior osteophyte noted.  Recommend physical therapy for bilateral shoulder impingement syndrome.  Scapular stabilization exercises and shoulder range of motion focus.  He has been to [facility] in the past.  Provided right shoulder subacromial injection today.  May take over-the-counter anti-inflammatories as needed for pain.    SHOULDER SUBACROMIAL SPACE INJECTION: Risks and benefits of a shoulder subacromial space injection were discussed and the patient desired to proceed. Verbal consent was obtained. The patient understood the risk of infection, potential skin changes, bump in blood glucose especially with diabetes, nerve injury, possibility of increased pain in the short term, and possible incomplete pain relief.  Using sterile technique, the shoulder subacromial space was injected from a posterior approach with 1mL of 40 mg triamcinolone acetonide 40 MG/ML and 4cc of lidocaine with aspiration prior to injection. The patient tolerated the procedure without difficulty.  CPT CODE 58914 for major joint aspiration/injection      Follow Up:   2 months        Daya Michaels PA-C  [facility]       Dictated using Dragon Speech Recognition.

## 2025-03-13 NOTE — PROGRESS NOTES
Procedure   - Large Joint Arthrocentesis: R subacromial bursa on 3/13/2025 10:32 AM  Indications: pain  Details: 21 G needle, posterior approach  Medications: 5 mL lidocaine PF 1% 1 %; 40 mg triamcinolone acetonide 40 MG/ML  Outcome: tolerated well, no immediate complications  Procedure, treatment alternatives, risks and benefits explained, specific risks discussed. Consent was given by the patient. Immediately prior to procedure a time out was called to verify the correct patient, procedure, equipment, support staff and site/side marked as required. Patient was prepped and draped in the usual sterile fashion.

## 2025-03-26 ENCOUNTER — TREATMENT (OUTPATIENT)
Dept: PHYSICAL THERAPY | Facility: CLINIC | Age: 55
End: 2025-03-26
Payer: COMMERCIAL

## 2025-03-26 DIAGNOSIS — R68.89 ACTIVITY INTOLERANCE: ICD-10-CM

## 2025-03-26 DIAGNOSIS — M25.611 DECREASED RANGE OF MOTION OF RIGHT SHOULDER: ICD-10-CM

## 2025-03-26 DIAGNOSIS — R53.1 WEAKNESS: ICD-10-CM

## 2025-03-26 DIAGNOSIS — M25.511 ACUTE PAIN OF RIGHT SHOULDER: Primary | ICD-10-CM

## 2025-03-26 PROCEDURE — 97161 PT EVAL LOW COMPLEX 20 MIN: CPT | Performed by: PHYSICAL THERAPIST

## 2025-03-26 PROCEDURE — 97535 SELF CARE MNGMENT TRAINING: CPT | Performed by: PHYSICAL THERAPIST

## 2025-03-26 PROCEDURE — 97110 THERAPEUTIC EXERCISES: CPT | Performed by: PHYSICAL THERAPIST

## 2025-03-26 NOTE — PROGRESS NOTES
Physical Therapy Initial Evaluation and Plan of Care  [facility] [place] [facility] [place]  [address], [address] [address]        Patient: Jean Marie Ramirez   : 1970  Diagnosis/ICD-10 Code:  Acute pain of right shoulder [M25.511]  Referring practitioner: Daya Michaels PA-C  Date of Initial Visit: 3/26/2025  Today's Date: 3/26/2025  Patient seen for 1 sessions         Visit Diagnoses:    ICD-10-CM ICD-9-CM   1. Acute pain of right shoulder  M25.511 719.41   2. Activity intolerance  R68.89 780.99   3. Weakness  R53.1 780.79   4. Decreased range of motion of right shoulder  M25.611 719.51         Subjective Questionnaire: QuickDASH: 6.82    Subjective Evaluation    History of Present Illness  Mechanism of injury: Pt presents with C/C of pain for the last 6 weeks in his superolateral region of his R shoulder when reaching in a combined motion of elevation and behind his back, mainly while opening the sliding glass doors behind his chair to let the dogs out. His injury doesn't prevent him from performing his job duties or physical activities.  Hx of bilateral OA of the shoulders. Pt utilizes a C-pap machine when sleeping. Additionally, when taking long trips he utilizes a beanie pillows to support his RUE which provides relief.     Imaging:   Findings: No acute bony pathology. No superior humeral head migration.  The humeral head remains centered in the glenohumeral joint. No evidence of calcific tendonitis.  Inferior acromial osteophyte noted.      Better: cortisone shot (R shoulder 2 weeks ago; L shoulder 2 years ago)  Worse: simultaneously reaching overhead and behind his back: sleeping on his side occasionally increases pain.   Current treatment: Cortisone shot: R shoulder 2 weels ago which provided relief. Pt applied topical ointment (pain green) which provided some relief.          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 pt enjoys  hiking and working on his house. Pain  Current pain ratin  At best pain ratin  At worst pain ratin (before shot 3-4)  Location: R shoulder  Quality: sharp  Relieving factors: relaxation, rest, support and medications (medication- specifically cortisone shot)  Aggravating factors: outstretched reach  Progression: improved    Hand dominance: right    Diagnostic Tests  Abnormal x-ray: see above.    Treatments  Current treatment: injection treatment  Patient Goals  Patient goals for therapy: decreased pain  Patient goal: learn what he can do to help improve his mobility and decrease pain with positions that provoke pain.           Objective          Static Posture     Comments  Patient demonstrates normal curvature of cervical, thoracic, and lumbar spine. Slightly forward headed posture and hand dominance pattern of R shoulder. Overall, pt did not present with any obvious abnormalities of his posture.    Palpation     Right   No palpable tenderness to the anterior deltoid, biceps, infraspinatus, middle deltoid, posterior deltoid, subscapularis, supraspinatus and teres minor.     Additional Palpation Details  (-) spine of scapula, greater and lesser tuberosity of humerus, acromion, distal clavicle, AC joint,    Active Range of Motion   Left Shoulder   Flexion: 153 degrees   Abduction: 171 degrees   External rotation 0°: 60 degrees   Internal rotation BTB: T11     Right Shoulder   Flexion: 155 degrees   Abduction: 171 degrees   External rotation 0°: 71 degrees   Internal rotation BTB: T12     Strength/Myotome Testing     Left Shoulder     Planes of Motion   Flexion: 4   Abduction: 5   External rotation at 0°: 4-   Internal rotation at 0°: 5     Isolated Muscles   Supraspinatus: 4     Right Shoulder     Planes of Motion   Flexion: 4 (pain at superolateral glenohumeral joint)   Adduction: 5   External rotation at 0°: 4-   Internal rotation at 0°: 5     Isolated Muscles   Supraspinatus: 4     Tests     Right  Shoulder   Negative empty can, external rotation lag sign, full can, Hawkin's, Neer's and painful arc.           Assessment & Plan       Assessment  Impairments: impaired physical strength, lacks appropriate home exercise program and pain with function   Functional limitations: uncomfortable because of pain, reaching behind back and reaching overhead   Assessment details: Pt is a 55 y/o male with a C/C of R superolateral shoulder pain that began 6 weeks ago that is caused by a combination of movements involving reaching overhead and behind his back. Pt presents with signs and symptoms consistent with subacromial impingement secondary to OA of the acromion. He recently had cortisone shot that relieved his symptoms and improved his function. Assessment revealed strength deficits in shoulder flexion and ER at 0 degrees which were addressed within the HEP with higher level functional exercises. Pt was educated on his condition, improvements seen with cortisone shot, and exercises provided with the aim to target deficits of decreased strength with ER and shoulder flexion. Pt will follow up with physical therapy in a few weeks to assess HEP and make progression if indicated.   Prognosis: good    Goals  Plan Goals: In 4 weeks  1. Pt will demonstrate compliance with initial HEP.  2. Pt will verbalize a maximum pain of 1/10 at worst.  3. Pt will demonstrate at least a score of 4+/5 MMT with shoulder flexion and ER at 0 degrees.    In 8 weeks  1. Pt will demonstrate compliance with final HEP and ability to self manage his condition.  2. Pt will demonstrate ability to perform movements involving elevation and reaching behind his back without pain.  3. Pt will demonstrate a score of 0 on the quick dash demonstrating no functional limitations.       Plan  Therapy options: will be seen for skilled therapy services  Planned modality interventions: cryotherapy, dry needling, TENS, iontophoresis, thermotherapy (hydrocollator packs)  and ultrasound  Planned therapy interventions: manual therapy, neuromuscular re-education, soft tissue mobilization, strengthening, stretching, functional ROM exercises, spinal/joint mobilization, motor coordination training, joint mobilization and therapeutic activities  Frequency: 1x month  Duration in visits: 2  Duration in weeks: 8  Plan details: Plan will include there ex, there act, NMR, and Manual therapy in order to improve patients functional abilities    History # of Personal Factors and/or Comorbidities: LOW (0)  Examination of Body System(s): # of elements: LOW (1-2)  Clinical Presentation: EVOLVING  Clinical Decision Making: LOW     Timed:         Manual Therapy:    0     mins  77865;     Therapeutic Exercise:    15     mins  30748;     Neuromuscular Erika:    0    mins  13694;    Therapeutic Activity:     0     mins  25699;     Gait Trainin     mins  14360;     Ultrasound:     0     mins  32662;    Ionto                               0    mins   17088  Self Care                       10     mins   08725      Un-Timed:  Canalith Repos    0     mins 76600  Group Therapy    0     mins 30886  Electrical Stimulation:    0     mins  69793 (MC );  Dry Needling     0     mins self-pay  Traction     0     mins 13647  Low Eval     30    Mins  30254  Mod Eval     0     Mins  57288  High Eval                       0     Mins  93055        Timed Treatment:   25   mins   Total Treatment:     55   mins    Franklin Porras, Physical Therapist Student, completed treatment under my direct supervision.     2025         PT: Herb Alberto PT     License Number: [id number]  Electronically signed by Franklin Porras, PT Student, 25, 8:01 AM EDT    Initial Certification  Certification Period: 2025  I certify that the therapy services are furnished while this patient is under my care.  The services outlined above are required by this patient, and will be reviewed every 90 days.     PHYSICIAN:  ________________________________________________________  Daya Michaels PA-C        DATE: ____________________________________________________________    Please sign and return via fax to [phone]. Thank you, [facility].

## 2025-06-05 ENCOUNTER — OFFICE VISIT (OUTPATIENT)
Dept: ORTHOPEDIC SURGERY | Facility: CLINIC | Age: 55
End: 2025-06-05
Payer: COMMERCIAL

## 2025-06-05 VITALS
HEIGHT: 74 IN | DIASTOLIC BLOOD PRESSURE: 96 MMHG | SYSTOLIC BLOOD PRESSURE: 142 MMHG | BODY MASS INDEX: 37.63 KG/M2 | WEIGHT: 293.21 LBS

## 2025-06-05 DIAGNOSIS — M75.41 IMPINGEMENT SYNDROME OF RIGHT SHOULDER: Primary | ICD-10-CM

## 2025-06-05 DIAGNOSIS — M25.511 RIGHT SHOULDER PAIN, UNSPECIFIED CHRONICITY: ICD-10-CM

## 2025-06-05 RX ORDER — ALBUTEROL SULFATE 90 UG/1
INHALANT RESPIRATORY (INHALATION)
COMMUNITY
Start: 2025-03-15

## 2025-06-05 RX ORDER — AMLODIPINE BESYLATE 5 MG/1
TABLET ORAL
COMMUNITY
Start: 2025-03-20

## 2025-06-05 RX ORDER — FLUTICASONE PROPIONATE 50 MCG
1 SPRAY, SUSPENSION (ML) NASAL DAILY
COMMUNITY
Start: 2025-03-16

## 2025-06-05 RX ORDER — MONTELUKAST SODIUM 10 MG/1
10 TABLET ORAL
COMMUNITY
Start: 2025-03-20

## 2025-06-05 RX ADMIN — LIDOCAINE HYDROCHLORIDE 5 ML: 10 INJECTION, SOLUTION EPIDURAL; INFILTRATION; INTRACAUDAL; PERINEURAL at 15:58

## 2025-06-05 RX ADMIN — TRIAMCINOLONE ACETONIDE 40 MG: 40 INJECTION, SUSPENSION INTRA-ARTICULAR; INTRAMUSCULAR at 15:58

## 2025-06-05 NOTE — PROGRESS NOTES
[facility] Follow Up       Office Follow Up Visit       Patient Name: Jean Marie Ramirez    Chief Complaint:   Chief Complaint   Patient presents with    Follow-up     2.5 month follow up - Impingement syndrome of right shoulder        Referring Physician: No ref. provider found    History of Present Illness:   Jean Marie Ramirez returns to clinic today for follow-up of right shoulder pain.  Ongoing for the last several months.  He has received corticosteroid injections in the subacromial space in the past.  These have helped tremendously.  Pain has since returned.      Subjective     Review of Systems   Constitutional: Negative.  Negative for chills, fatigue and fever.   HENT: Negative.  Negative for congestion and dental problem.    Eyes: Negative.  Negative for blurred vision.   Respiratory: Negative.  Negative for shortness of breath.    Cardiovascular: Negative.  Negative for leg swelling.   Gastrointestinal: Negative.  Negative for abdominal pain.   Endocrine: Negative.  Negative for polyuria.   Genitourinary: Negative.  Negative for difficulty urinating.   Musculoskeletal:  Positive for arthralgias.   Skin: Negative.    Allergic/Immunologic: Negative.    Neurological: Negative.    Hematological: Negative.  Negative for adenopathy.   Psychiatric/Behavioral: Negative.  Negative for behavioral problems.         I have reviewed and updated the following portions of the patient's history and review of systems: allergies, current medications, past family history, past medical history, past social history, past surgical history and problem list.    Medications:   Current Outpatient Medications:     albuterol sulfate  (90 Base) MCG/ACT inhaler, INHALE 2 PUFFS BY MOUTH THREE TIMES DAILY AS NEEDED FOR SHORTNESS OF BREATH, Disp: , Rfl:     amLODIPine (NORVASC) 5 MG tablet, , Disp: , Rfl:     atorvastatin (LIPITOR) 20 MG tablet, Take 1 tablet  "by mouth Daily., Disp: , Rfl:     Eliquis 5 MG tablet tablet, Take 1 tablet by mouth., Disp: , Rfl:     fluticasone (FLONASE) 50 MCG/ACT nasal spray, 1 spray by Each Nare route Daily., Disp: , Rfl:     lisinopril (PRINIVIL,ZESTRIL) 20 MG tablet, Take 1 tablet by mouth Daily., Disp: , Rfl:     lisinopril-hydrochlorothiazide (PRINZIDE,ZESTORETIC) 10-12.5 MG per tablet, Take 1 tablet by mouth Daily., Disp: , Rfl:     methylPREDNISolone (MEDROL) 4 MG dose pack, Take 1 tablet by mouth Daily. Use as directed by package instructions, Disp: 21 tablet, Rfl: 0    montelukast (SINGULAIR) 10 MG tablet, Take 1 tablet by mouth., Disp: , Rfl:     Tirzepatide 2.5 MG/0.5ML solution auto-injector, Inject 2.5 mg under the skin into the appropriate area as directed., Disp: , Rfl:     Allergies: No Known Allergies      Objective      Vital Signs:   Vitals:    06/05/25 1552   BP: 142/96   Weight: 133 kg (293 lb 3.4 oz)   Height: 188 cm (74.02\")       Ortho Exam:  General: no acute distress, comfortable  Vitals reviewed in chart    Musculoskeletal Exam:    SIDE: Right shoulder    Tenderness: lateral    Range of motion measurements (degrees): 140/140/60/40  Painful arc of motion: yes  No evidence of septic joint      Results Review:  XR Shoulder 2+ View Right  Imaging: shoulder x-rays 3 views - AP, axillary, and scapular-Y x-ray   views    Side: RIGHT SHOULDER    Indication for shoulder x-ray 3 views: shoulder pain    Comparison: contralateral comparison views available    Findings: No acute bony pathology. No superior humeral head migration.    The humeral head remains centered in the glenohumeral joint. No evidence   of calcific tendonitis.  Inferior acromial osteophyte noted.    I personally reviewed the above x-rays.       I have noted results reviewed from previous encounter.        Assessment / Plan      Assessment:   Diagnoses and all orders for this visit:    1. Impingement syndrome of right shoulder (Primary)    2. Right shoulder " pain, unspecified chronicity    3. Rotator cuff tendinitis, left    Other orders  -     - Large Joint Arthrocentesis: R subacromial bursa        Quality Metrics:   BMI:   Class 2 Severe Obesity (BMI >=35 and <=39.9). Obesity-related health conditions include the following: osteoarthritis.     Tobacco:   Jean Marie Ramirez  reports that he has never smoked. He has never used smokeless tobacco.           Plan:  Patient presents today with right shoulder pain that has improved in the past with subacromial cortisone injection.  Recommend right shoulder subacromial corticosteroid injection today.  May consider MRI if pain persists.    SHOULDER SUBACROMIAL SPACE INJECTION: Risks and benefits of a shoulder subacromial space injection were discussed and the patient desired to proceed. Verbal consent was obtained. The patient understood the risk of infection, potential skin changes, bump in blood glucose especially with diabetes, nerve injury, possibility of increased pain in the short term, and possible incomplete pain relief.  Using sterile technique, the shoulder subacromial space was injected from a posterior approach with 1mL of 40 mg triamcinolone acetonide 40 MG/ML and 4cc of lidocaine with aspiration prior to injection. The patient tolerated the procedure without difficulty.  CPT CODE 38498 for major joint aspiration/injection      Follow Up:   Return as needed      Daya Michaels PA-C  [facility]    Dictated using Dragon Speech Recognition.

## 2025-06-05 NOTE — PROGRESS NOTES
Procedure   - Large Joint Arthrocentesis: R subacromial bursa on 6/5/2025 3:58 PM  Indications: pain  Details: 21 G needle, posterior approach  Medications: 5 mL lidocaine PF 1% 1 %; 40 mg triamcinolone acetonide 40 MG/ML  Outcome: tolerated well, no immediate complications  Procedure, treatment alternatives, risks and benefits explained, specific risks discussed. Consent was given by the patient. Immediately prior to procedure a time out was called to verify the correct patient, procedure, equipment, support staff and site/side marked as required. Patient was prepped and draped in the usual sterile fashion.

## 2025-06-10 RX ORDER — TRIAMCINOLONE ACETONIDE 40 MG/ML
40 INJECTION, SUSPENSION INTRA-ARTICULAR; INTRAMUSCULAR
Status: COMPLETED | OUTPATIENT
Start: 2025-06-05 | End: 2025-06-05

## 2025-06-10 RX ORDER — LIDOCAINE HYDROCHLORIDE 10 MG/ML
5 INJECTION, SOLUTION EPIDURAL; INFILTRATION; INTRACAUDAL; PERINEURAL
Status: COMPLETED | OUTPATIENT
Start: 2025-06-05 | End: 2025-06-05

## 2025-08-14 ENCOUNTER — OFFICE VISIT (OUTPATIENT)
Dept: ORTHOPEDIC SURGERY | Facility: CLINIC | Age: 55
End: 2025-08-14
Payer: COMMERCIAL

## 2025-08-14 VITALS
SYSTOLIC BLOOD PRESSURE: 130 MMHG | BODY MASS INDEX: 36.06 KG/M2 | HEIGHT: 74 IN | WEIGHT: 281 LBS | DIASTOLIC BLOOD PRESSURE: 84 MMHG

## 2025-08-14 DIAGNOSIS — M25.522 LEFT ELBOW PAIN: ICD-10-CM

## 2025-08-14 DIAGNOSIS — M70.22 OLECRANON BURSITIS OF LEFT ELBOW: Primary | ICD-10-CM

## 2025-08-14 RX ORDER — IBUPROFEN 800 MG/1
800 TABLET, FILM COATED ORAL EVERY 8 HOURS PRN
Qty: 90 TABLET | Refills: 1 | Status: SHIPPED | OUTPATIENT
Start: 2025-08-14

## 2025-08-14 RX ORDER — SULFAMETHOXAZOLE AND TRIMETHOPRIM 800; 160 MG/1; MG/1
1 TABLET ORAL 2 TIMES DAILY
Qty: 14 TABLET | Refills: 0 | Status: SHIPPED | OUTPATIENT
Start: 2025-08-14 | End: 2025-08-21